# Patient Record
Sex: MALE | Race: WHITE | NOT HISPANIC OR LATINO | URBAN - METROPOLITAN AREA
[De-identification: names, ages, dates, MRNs, and addresses within clinical notes are randomized per-mention and may not be internally consistent; named-entity substitution may affect disease eponyms.]

---

## 2017-03-30 ENCOUNTER — EMERGENCY (EMERGENCY)
Facility: HOSPITAL | Age: 32
LOS: 1 days | Discharge: HOME | End: 2017-03-30
Attending: EMERGENCY MEDICINE | Admitting: EMERGENCY MEDICINE
Payer: COMMERCIAL

## 2017-03-30 VITALS
WEIGHT: 184.97 LBS | HEIGHT: 72 IN | DIASTOLIC BLOOD PRESSURE: 88 MMHG | OXYGEN SATURATION: 98 % | RESPIRATION RATE: 16 BRPM | HEART RATE: 76 BPM | TEMPERATURE: 98 F | SYSTOLIC BLOOD PRESSURE: 146 MMHG

## 2017-03-30 DIAGNOSIS — S61.210A LACERATION WITHOUT FOREIGN BODY OF RIGHT INDEX FINGER WITHOUT DAMAGE TO NAIL, INITIAL ENCOUNTER: ICD-10-CM

## 2017-03-30 PROCEDURE — 99283 EMERGENCY DEPT VISIT LOW MDM: CPT | Mod: 25

## 2017-03-30 PROCEDURE — 73140 X-RAY EXAM OF FINGER(S): CPT

## 2017-03-30 PROCEDURE — 12041 INTMD RPR N-HF/GENIT 2.5CM/<: CPT

## 2017-03-30 PROCEDURE — 73140 X-RAY EXAM OF FINGER(S): CPT | Mod: 26,RT

## 2017-03-30 RX ORDER — IBUPROFEN 200 MG
600 TABLET ORAL ONCE
Qty: 0 | Refills: 0 | Status: COMPLETED | OUTPATIENT
Start: 2017-03-30 | End: 2017-03-30

## 2017-03-30 RX ADMIN — Medication 600 MILLIGRAM(S): at 17:16

## 2017-03-30 NOTE — ED ADULT NURSE NOTE - OBJECTIVE STATEMENT
31 year old male alert and oriented x 4 came to the ED from Urgent Care c/o of right pinky injury at work.  Patient states he smashed his finger between 2 pieces of metal and went to Urgent Care for eval.  Patient states Urgent Care saw debris on his xray and sent him to the ED for evaluation.  Patient came in with a bandage from Urgent Care on the right pinky.  Patient states he has a 2/10 pain level that stings, but in no acute distress.  Patient came to the ED and bleeding was controlled.  Radial pulse present, patient able to move pinky and denies loss of sensation or numbness and tingling or dizziness.  Hand color is consistent with race.  Upon exam by MD patient removed bandage and saw right medial pinky tip was avulsed, but bleeding is controlled.  Md discussed plan of care with patient.  Safety ensured.

## 2017-03-30 NOTE — ED PROVIDER NOTE - MEDICAL DECISION MAKING DETAILS
Otherwise healthy LHD 31 y M presents from urgent care center after crush injury to R 5th digit, distal phalanx.  Hand caught b/t two metal pieces.  Dressed at urgent care center.  Tdap UTD.  No analgesia provided yet.  No paresthesias.  XRs at urgent care center showed no fx but ?FBs?  review of systems -- hand lac, otherwise negative  PE -- GENERAL: AAOx4, GCS 15, NAD, WDWN; HEENT: MMM, no jugular venous distension, supple neck, PERRLA, EOMI, nonicteric sclera; PULM: CTA B, no crackles/rubs/rales; CV: RRR, S1S2, no MRG; ABD: Flat abdomen, NTND, no R/G/R, no CVAT.  MSK: DUBOSE, +2 pulses x4;  NEURO: No obvious focal deficits; PSYCH: AAOx3, clear thought and normal sensorium.  SKIN -- Partially avulsed/lacerated distal phalanx on R hand.  Minimally involving nail plate, distal aspect well perfused.  Minimally oozing blood.  No nv deficits.  No other injuries.  AP -- Crush injury to nondominant 5th distal phalanx.  No amputation.  ?FB on outpt xr?  Pain control, analgesia, copious irrigation, repair lac, rpt xr, d/c.  --PARTH Otherwise healthy LHD 31 y M presents from urgent care center after crush injury to R 5th digit, distal phalanx.  Hand caught b/t two metal pieces.  Dressed at urgent care center.  Tdap UTD.  No analgesia provided yet.  No paresthesias.  XRs at urgent care center showed no fx but ?FBs?  review of systems -- hand lac, otherwise negative  PE -- GENERAL: AAOx4, GCS 15, NAD, WDWN; HEENT: MMM, no jugular venous distension, supple neck, PERRLA, EOMI, nonicteric sclera; PULM: CTA B, no crackles/rubs/rales; CV: RRR, S1S2, no MRG; ABD: Flat abdomen, NTND, no R/G/R, no CVAT.  MSK: DUBOSE, +2 pulses x4;  NEURO: No obvious focal deficits; PSYCH: AAOx3, clear thought and normal sensorium.  SKIN -- 0.5cm laceration at distal-most aspect of d phalanx on R hand.  Minimally involving nail plate, distal aspect well perfused.  Minimally oozing blood.  No nv deficits.  No other injuries.  AP -- Crush injury to nondominant 5th distal phalanx.  No amputation.  ?FB on outpt xr?  Pain control, analgesia, copious irrigation, repair lac, rpt xr, d/c.  --BMM

## 2017-03-30 NOTE — ED PROVIDER NOTE - PROGRESS NOTE DETAILS
Punctate metallic FB x 2 removed from lac.  See procedure note.  --BMM discussed with patient need for antibiotic coverage. sent clindamycin TID to pharmacy and advised on use. Advised on importance of follow-up with hand surgeon within a week for reevaluation. patient understands and agrees. -Pepper Goetz PA-C

## 2017-04-12 ENCOUNTER — EMERGENCY (EMERGENCY)
Facility: HOSPITAL | Age: 32
LOS: 1 days | Discharge: HOME | End: 2017-04-12
Attending: EMERGENCY MEDICINE | Admitting: EMERGENCY MEDICINE
Payer: COMMERCIAL

## 2017-04-12 VITALS
HEART RATE: 94 BPM | TEMPERATURE: 98 F | DIASTOLIC BLOOD PRESSURE: 90 MMHG | OXYGEN SATURATION: 97 % | RESPIRATION RATE: 18 BRPM | SYSTOLIC BLOOD PRESSURE: 132 MMHG
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
## 2017-04-12 DIAGNOSIS — S61.324D: ICD-10-CM

## 2017-04-12 DIAGNOSIS — Z88.0 ALLERGY STATUS TO PENICILLIN: ICD-10-CM

## 2017-04-12 DIAGNOSIS — Z79.899 OTHER LONG TERM (CURRENT) DRUG THERAPY: ICD-10-CM

## 2017-04-12 PROCEDURE — G0463: CPT

## 2017-04-12 NOTE — ED PROVIDER NOTE - ATTENDING CONTRIBUTION TO CARE
Attending MD [place]: I personally have seen and examined this patient.  Resident note reviewed and agree on plan of care and except where noted.  See HPI for details.

## 2017-04-12 NOTE — ED ADULT NURSE NOTE - OBJECTIVE STATEMENT
31y male pt, no PMH, arrived to ED for suture removal on right pinky finger s/p injury at work on Mar 30. Wound is well proximated, no fever/chills, no drainage, no redness noted.

## 2017-04-12 NOTE — ED PROVIDER NOTE - OBJECTIVE STATEMENT
Attending MD [place]: 31M with no reported PMH presents for removal of sutures from R pinky s/p "slamming finger between two metal plates".  Since repair laceration, no purulence or bleeding from site.  Denies fevers, chills, limitation of motion at finger or hand, redness surrounding wound, sensory loss.  Reports Tetanus was UTD at time of injury.  On exam, edges well approximated, C/D/I, cap refill <2 sec.  Will d/c sutures and then discharge patient.

## 2018-12-26 ENCOUNTER — TRANSCRIPTION ENCOUNTER (OUTPATIENT)
Age: 33
End: 2018-12-26

## 2018-12-27 ENCOUNTER — INPATIENT (INPATIENT)
Facility: HOSPITAL | Age: 33
LOS: 1 days | Discharge: HOME | DRG: 872 | End: 2018-12-29
Attending: HOSPITALIST | Admitting: HOSPITALIST
Payer: COMMERCIAL

## 2018-12-27 VITALS
DIASTOLIC BLOOD PRESSURE: 97 MMHG | SYSTOLIC BLOOD PRESSURE: 164 MMHG | HEART RATE: 100 BPM | OXYGEN SATURATION: 98 % | TEMPERATURE: 98 F | WEIGHT: 190.04 LBS | HEIGHT: 72 IN | RESPIRATION RATE: 18 BRPM

## 2018-12-27 LAB
ALBUMIN SERPL ELPH-MCNC: 4.6 G/DL — SIGNIFICANT CHANGE UP (ref 3.3–5)
ALP SERPL-CCNC: 77 U/L — SIGNIFICANT CHANGE UP (ref 40–120)
ALT FLD-CCNC: 23 U/L — SIGNIFICANT CHANGE UP (ref 10–45)
ANION GAP SERPL CALC-SCNC: 14 MMOL/L — SIGNIFICANT CHANGE UP (ref 5–17)
AST SERPL-CCNC: 26 U/L — SIGNIFICANT CHANGE UP (ref 10–40)
BASOPHILS # BLD AUTO: 0 K/UL — SIGNIFICANT CHANGE UP (ref 0–0.2)
BASOPHILS NFR BLD AUTO: 0.2 % — SIGNIFICANT CHANGE UP (ref 0–2)
BILIRUB SERPL-MCNC: 0.7 MG/DL — SIGNIFICANT CHANGE UP (ref 0.2–1.2)
BUN SERPL-MCNC: 7 MG/DL — SIGNIFICANT CHANGE UP (ref 7–23)
CALCIUM SERPL-MCNC: 9.3 MG/DL — SIGNIFICANT CHANGE UP (ref 8.4–10.5)
CHLORIDE SERPL-SCNC: 97 MMOL/L — SIGNIFICANT CHANGE UP (ref 96–108)
CO2 SERPL-SCNC: 26 MMOL/L — SIGNIFICANT CHANGE UP (ref 22–31)
CREAT SERPL-MCNC: 0.96 MG/DL — SIGNIFICANT CHANGE UP (ref 0.5–1.3)
EOSINOPHIL # BLD AUTO: 0.1 K/UL — SIGNIFICANT CHANGE UP (ref 0–0.5)
EOSINOPHIL NFR BLD AUTO: 0.6 % — SIGNIFICANT CHANGE UP (ref 0–6)
GLUCOSE SERPL-MCNC: 106 MG/DL — HIGH (ref 70–99)
HCT VFR BLD CALC: 41.7 % — SIGNIFICANT CHANGE UP (ref 39–50)
HGB BLD-MCNC: 14.7 G/DL — SIGNIFICANT CHANGE UP (ref 13–17)
LYMPHOCYTES # BLD AUTO: 1 K/UL — SIGNIFICANT CHANGE UP (ref 1–3.3)
LYMPHOCYTES # BLD AUTO: 7.2 % — LOW (ref 13–44)
MCHC RBC-ENTMCNC: 32.5 PG — SIGNIFICANT CHANGE UP (ref 27–34)
MCHC RBC-ENTMCNC: 35.3 GM/DL — SIGNIFICANT CHANGE UP (ref 32–36)
MCV RBC AUTO: 92 FL — SIGNIFICANT CHANGE UP (ref 80–100)
MONOCYTES # BLD AUTO: 1 K/UL — HIGH (ref 0–0.9)
MONOCYTES NFR BLD AUTO: 6.8 % — SIGNIFICANT CHANGE UP (ref 2–14)
NEUTROPHILS # BLD AUTO: 12.2 K/UL — HIGH (ref 1.8–7.4)
NEUTROPHILS NFR BLD AUTO: 85.2 % — HIGH (ref 43–77)
PLATELET # BLD AUTO: 188 K/UL — SIGNIFICANT CHANGE UP (ref 150–400)
POTASSIUM SERPL-MCNC: 4.2 MMOL/L — SIGNIFICANT CHANGE UP (ref 3.5–5.3)
POTASSIUM SERPL-SCNC: 4.2 MMOL/L — SIGNIFICANT CHANGE UP (ref 3.5–5.3)
PROT SERPL-MCNC: 7.5 G/DL — SIGNIFICANT CHANGE UP (ref 6–8.3)
RBC # BLD: 4.53 M/UL — SIGNIFICANT CHANGE UP (ref 4.2–5.8)
RBC # FLD: 11.2 % — SIGNIFICANT CHANGE UP (ref 10.3–14.5)
SODIUM SERPL-SCNC: 137 MMOL/L — SIGNIFICANT CHANGE UP (ref 135–145)
WBC # BLD: 14.3 K/UL — HIGH (ref 3.8–10.5)
WBC # FLD AUTO: 14.3 K/UL — HIGH (ref 3.8–10.5)

## 2018-12-27 PROCEDURE — 99220: CPT

## 2018-12-27 RX ORDER — SODIUM CHLORIDE 9 MG/ML
3 INJECTION INTRAMUSCULAR; INTRAVENOUS; SUBCUTANEOUS EVERY 8 HOURS
Qty: 0 | Refills: 0 | Status: DISCONTINUED | OUTPATIENT
Start: 2018-12-27 | End: 2018-12-29

## 2018-12-27 RX ORDER — AMPICILLIN SODIUM AND SULBACTAM SODIUM 250; 125 MG/ML; MG/ML
INJECTION, POWDER, FOR SUSPENSION INTRAMUSCULAR; INTRAVENOUS
Qty: 0 | Refills: 0 | Status: DISCONTINUED | OUTPATIENT
Start: 2018-12-27 | End: 2018-12-27

## 2018-12-27 RX ORDER — AMPICILLIN SODIUM AND SULBACTAM SODIUM 250; 125 MG/ML; MG/ML
3 INJECTION, POWDER, FOR SUSPENSION INTRAMUSCULAR; INTRAVENOUS ONCE
Qty: 0 | Refills: 0 | Status: COMPLETED | OUTPATIENT
Start: 2018-12-27 | End: 2018-12-27

## 2018-12-27 RX ORDER — KETOROLAC TROMETHAMINE 30 MG/ML
15 SYRINGE (ML) INJECTION ONCE
Qty: 0 | Refills: 0 | Status: DISCONTINUED | OUTPATIENT
Start: 2018-12-27 | End: 2018-12-27

## 2018-12-27 RX ORDER — SODIUM CHLORIDE 9 MG/ML
1000 INJECTION, SOLUTION INTRAVENOUS ONCE
Qty: 0 | Refills: 0 | Status: COMPLETED | OUTPATIENT
Start: 2018-12-27 | End: 2018-12-27

## 2018-12-27 RX ADMIN — Medication 15 MILLIGRAM(S): at 21:23

## 2018-12-27 RX ADMIN — AMPICILLIN SODIUM AND SULBACTAM SODIUM 3 GRAM(S): 250; 125 INJECTION, POWDER, FOR SUSPENSION INTRAMUSCULAR; INTRAVENOUS at 21:13

## 2018-12-27 RX ADMIN — Medication 15 MILLIGRAM(S): at 20:43

## 2018-12-27 RX ADMIN — SODIUM CHLORIDE 3 MILLILITER(S): 9 INJECTION INTRAMUSCULAR; INTRAVENOUS; SUBCUTANEOUS at 21:23

## 2018-12-27 RX ADMIN — Medication 100 MILLIGRAM(S): at 20:43

## 2018-12-27 RX ADMIN — SODIUM CHLORIDE 4000 MILLILITER(S): 9 INJECTION, SOLUTION INTRAVENOUS at 21:22

## 2018-12-27 RX ADMIN — AMPICILLIN SODIUM AND SULBACTAM SODIUM 200 GRAM(S): 250; 125 INJECTION, POWDER, FOR SUSPENSION INTRAMUSCULAR; INTRAVENOUS at 20:43

## 2018-12-27 RX ADMIN — SODIUM CHLORIDE 1000 MILLILITER(S): 9 INJECTION, SOLUTION INTRAVENOUS at 23:26

## 2018-12-27 NOTE — ED PROVIDER NOTE - MEDICAL DECISION MAKING DETAILS
see attending note ------------ATTENDING NOTE------------  pt c/o gradually increasing redness, mild dull ache, slight swelling to R anterior shin, no recent trauma, c/w cellulitis, no significant improvement w/ Bactrim, no obvious abscess, has FROM and 5/5 w/o pain in knee, nvi w bcr distally, will cover strep/mrsa, CDU for obs for improvement, potential US if no significant improvement and new concerns for abscess, likely d/c w/ close outpt fu if wnl.  - Burak Blake MD   -----------------------------------------------

## 2018-12-27 NOTE — ED CDU PROVIDER INITIAL DAY NOTE - ATTENDING CONTRIBUTION TO CARE
------------ATTENDING NOTE------------   see my ED Note -- agree w/ PA's documentation, clinical actions, medical decision making.  - Burak Blake MD   ----------------------------------------------

## 2018-12-27 NOTE — ED ADULT NURSE NOTE - NSIMPLEMENTINTERV_GEN_ALL_ED
Implemented All Universal Safety Interventions:  [place] to call system. Call bell, personal items and telephone within reach. Instruct patient to call for assistance. Room bathroom lighting operational. Non-slip footwear when patient is off stretcher. Physically safe environment: no spills, clutter or unnecessary equipment. Stretcher in lowest position, wheels locked, appropriate side rails in place.

## 2018-12-27 NOTE — ED CDU PROVIDER DISPOSITION NOTE - PLAN OF CARE
1. Rest and elevate affected area. stay hydrated.  2. Take Doxycycline and Augmentin as prescribed. Take Motrin 600mg every 8 hours with food for pain/swelling.   3. Follow up with your PMD within 48-72hours.   4. Any worsening redness, swelling, streaking (red lines), fever, chills return to ER.

## 2018-12-27 NOTE — ED CDU PROVIDER DISPOSITION NOTE - CLINICAL COURSE
34y/o M with no PMH c/o LLE swelling/pain x 3 days. pt states he noticed a bump he thought was an ingrown hair on his proximal shin, scratched it, went to to work at [employer], leg got wet while working, then noticed the swelling and redness. swelling and redness worsened. pt went to  and was started on Bactrim DS, took 3 doses, but leg continued to worsen, developed some pain, increased redness and swelling, noted minimal pus d/c. states pain is worse with bending knee and with weight bearing. denies fever, chills, sweats, CP, abd pain, back pain, SOB, N/V/D, other symptoms.   In ED WBC 14.3, other labs unremarkable, pt started on doxy and unasyn. pt sent to CDU for continue antibiotics.   In CDU, ___________. 34y/o M with no PMH c/o LLE swelling/pain x 3 days. pt states he noticed a bump he thought was an ingrown hair on his proximal shin, scratched it, went to to work at [employer], leg got wet while working, then noticed the swelling and redness. swelling and redness worsened. pt went to  and was started on Bactrim DS, took 3 doses, but leg continued to worsen, developed some pain, increased redness and swelling, noted minimal pus d/c. states pain is worse with bending knee and with weight bearing. denies fever, chills, sweats, CP, abd pain, back pain, SOB, N/V/D, other symptoms.   In ED WBC 14.3, other labs unremarkable, pt started on doxy and unasyn. pt sent to CDU for continue antibiotics.   In CDU pt cellulitis worsened despite Doxy+Unasyn. Abx regimen was changed to Doxy+Cipro, pt had one dose of ciprofloxacin and on repeat examination there was no apparent change in erythema. ID consulted to ensure proper abx regimen, recommended IV abx x 24 hours with repeat ID eval in the morning. Pt will need to be admitted for IV abx and consult. Pt safe and stable for admission.

## 2018-12-27 NOTE — ED CDU PROVIDER INITIAL DAY NOTE - OBJECTIVE STATEMENT
34y/o M with no PMH c/o LLE swelling/pain x 3 days. pt states he noticed a bump he thought was an ingrown hair on his proximal shin, scratched it, went to to work at [employer], leg got wet while working, then noticed the swelling and redness. swelling and redness worsened. pt went to  and was started on Bactrim DS, took 3 doses, but leg continued to worsen, developed some pain, increased redness and swelling, noted minimal pus d/c. states pain is worse with bending knee and with weight bearing. denies fever, chills, sweats, CP, abd pain, back pain, SOB, N/V/D, other symptoms.   In ED WBC 14.3, other labs unremarkable, pt started on doxy and unasyn. pt sent to CDU for continue antibiotics.     PMD Dr. Izabella Tripathi

## 2018-12-27 NOTE — ED CDU PROVIDER DISPOSITION NOTE - ATTENDING CONTRIBUTION TO CARE
Patient in CDU for cellulitis of L shin.  Was on bactrim as outpatient without improvement, went to Emergency Department, started on unasyn and doxycycline and placed in CDU.  Overnight had worsening  spread of cellulitis - unasyn changed to cipro as patient was exposed to bilge water concern for pseudomonal exposure.  Today patient reporting noticing no significant change in symptoms, continuing to have pain, no noted improvement with cellulitis to date.  ID evaluated patient in CDU and recommending minimum of 24 hours continuing current antibiotic regimen, will require admission at this point for further IV antibiotics, ID to follow.  Vic Lord M.D.

## 2018-12-27 NOTE — ED ADULT NURSE NOTE - OBJECTIVE STATEMENT
Pt presents to ED awake, alert and ambulatory, c/o left knee redness and swelling. 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 Pt was started on Bactrim yesterday and did not see improvement so came to ED. No fever. Redness noted to left knee.

## 2018-12-27 NOTE — ED PROVIDER NOTE - FAMILY HISTORY
Mother  Still living? Unknown  Family history of brain aneurysm, Age at diagnosis: Age Unknown     Father  Still living? Unknown  Family history of prostate cancer, Age at diagnosis: Age Unknown     Grandparent  Still living? Unknown  Family history of prostate cancer, Age at diagnosis: Age Unknown     Uncle  Still living? Unknown  Family history of prostate cancer, Age at diagnosis: Age Unknown

## 2018-12-27 NOTE — ED PROVIDER NOTE - PHYSICAL EXAMINATION
R anterior shin w/ 3 cm circular tense erythematous edema, no flocculence/drainage, +FROM RLE, 5/5, nvi w/ bcr distally  Well Appearing, Nontoxic, NAD;  Symm Facies, PERRL 3mm, (-)Pallor, Anicteric, MMM;  RRR w/o m/g/r;   CTAB w/o w/r/r;   Abd soft, nt/nd, +bs;  No edema/calf tender; AOX3, Normal speech, normal strength/sensation/gait

## 2018-12-27 NOTE — ED ADULT NURSE REASSESSMENT NOTE - NS ED NURSE REASSESS COMMENT FT1
Received pt from [facility]) , received pt alert and responsive, oriented x4, denies any respiratory distress, SOB, or difficulty breathing. Pt transferred to CDU for L knee redness, swelling and increased pain with ambulation. Pain currently 3/10, declined pain medication, will monitor and reassess. IV in place, patent and free of signs of infiltration, placed on ordered IV fluids, tolerating well. V/S stable, pt afebrile. Pt educated on unit and unit rules, instructed patient to notify RN of any needed assistance, Pt verbalizes understanding, Call bell placed within reach. Safety maintained. Will continue to monitor. Mother at bedside.

## 2018-12-27 NOTE — ED ADULT NURSE REASSESSMENT NOTE - GENERAL PATIENT STATE
comfortable appearance/cooperative/no change observed/resting/sleeping/family/SO at bedside/smiling/interactive

## 2018-12-27 NOTE — ED ADULT NURSE REASSESSMENT NOTE - COMFORT CARE
plan of care explained/po fluids offered/warm blanket provided/repositioned/ambulated to bathroom/darkened lights

## 2018-12-28 DIAGNOSIS — Z72.0 TOBACCO USE: ICD-10-CM

## 2018-12-28 DIAGNOSIS — L03.90 CELLULITIS, UNSPECIFIED: ICD-10-CM

## 2018-12-28 DIAGNOSIS — Z29.9 ENCOUNTER FOR PROPHYLACTIC MEASURES, UNSPECIFIED: ICD-10-CM

## 2018-12-28 DIAGNOSIS — A41.9 SEPSIS, UNSPECIFIED ORGANISM: ICD-10-CM

## 2018-12-28 PROCEDURE — 99254 IP/OBS CNSLTJ NEW/EST MOD 60: CPT

## 2018-12-28 PROCEDURE — 99217: CPT

## 2018-12-28 PROCEDURE — 99223 1ST HOSP IP/OBS HIGH 75: CPT | Mod: GC

## 2018-12-28 RX ORDER — ACETAMINOPHEN 500 MG
650 TABLET ORAL EVERY 6 HOURS
Qty: 0 | Refills: 0 | Status: DISCONTINUED | OUTPATIENT
Start: 2018-12-28 | End: 2018-12-29

## 2018-12-28 RX ORDER — CIPROFLOXACIN LACTATE 400MG/40ML
400 VIAL (ML) INTRAVENOUS ONCE
Qty: 0 | Refills: 0 | Status: COMPLETED | OUTPATIENT
Start: 2018-12-28 | End: 2018-12-28

## 2018-12-28 RX ORDER — IBUPROFEN 200 MG
400 TABLET ORAL ONCE
Qty: 0 | Refills: 0 | Status: COMPLETED | OUTPATIENT
Start: 2018-12-28 | End: 2018-12-28

## 2018-12-28 RX ORDER — ENOXAPARIN SODIUM 100 MG/ML
40 INJECTION SUBCUTANEOUS DAILY
Qty: 0 | Refills: 0 | Status: DISCONTINUED | OUTPATIENT
Start: 2018-12-28 | End: 2018-12-29

## 2018-12-28 RX ORDER — KETOROLAC TROMETHAMINE 30 MG/ML
15 SYRINGE (ML) INJECTION ONCE
Qty: 0 | Refills: 0 | Status: DISCONTINUED | OUTPATIENT
Start: 2018-12-28 | End: 2018-12-28

## 2018-12-28 RX ORDER — AMPICILLIN SODIUM AND SULBACTAM SODIUM 250; 125 MG/ML; MG/ML
3 INJECTION, POWDER, FOR SUSPENSION INTRAMUSCULAR; INTRAVENOUS EVERY 6 HOURS
Qty: 0 | Refills: 0 | Status: DISCONTINUED | OUTPATIENT
Start: 2018-12-28 | End: 2018-12-28

## 2018-12-28 RX ORDER — CIPROFLOXACIN LACTATE 400MG/40ML
400 VIAL (ML) INTRAVENOUS EVERY 12 HOURS
Qty: 0 | Refills: 0 | Status: DISCONTINUED | OUTPATIENT
Start: 2018-12-28 | End: 2018-12-29

## 2018-12-28 RX ORDER — CIPROFLOXACIN LACTATE 400MG/40ML
VIAL (ML) INTRAVENOUS
Qty: 0 | Refills: 0 | Status: DISCONTINUED | OUTPATIENT
Start: 2018-12-28 | End: 2018-12-29

## 2018-12-28 RX ADMIN — Medication 110 MILLIGRAM(S): at 21:10

## 2018-12-28 RX ADMIN — Medication 650 MILLIGRAM(S): at 20:22

## 2018-12-28 RX ADMIN — Medication 15 MILLIGRAM(S): at 02:33

## 2018-12-28 RX ADMIN — SODIUM CHLORIDE 3 MILLILITER(S): 9 INJECTION INTRAMUSCULAR; INTRAVENOUS; SUBCUTANEOUS at 15:32

## 2018-12-28 RX ADMIN — Medication 110 MILLIGRAM(S): at 07:50

## 2018-12-28 RX ADMIN — Medication 400 MILLIGRAM(S): at 13:55

## 2018-12-28 RX ADMIN — SODIUM CHLORIDE 3 MILLILITER(S): 9 INJECTION INTRAMUSCULAR; INTRAVENOUS; SUBCUTANEOUS at 22:05

## 2018-12-28 RX ADMIN — AMPICILLIN SODIUM AND SULBACTAM SODIUM 3 GRAM(S): 250; 125 INJECTION, POWDER, FOR SUSPENSION INTRAMUSCULAR; INTRAVENOUS at 02:32

## 2018-12-28 RX ADMIN — Medication 200 MILLIGRAM(S): at 16:21

## 2018-12-28 RX ADMIN — SODIUM CHLORIDE 3 MILLILITER(S): 9 INJECTION INTRAMUSCULAR; INTRAVENOUS; SUBCUTANEOUS at 05:31

## 2018-12-28 RX ADMIN — Medication 15 MILLIGRAM(S): at 02:09

## 2018-12-28 RX ADMIN — Medication 650 MILLIGRAM(S): at 20:52

## 2018-12-28 RX ADMIN — Medication 400 MILLIGRAM(S): at 05:48

## 2018-12-28 RX ADMIN — Medication 400 MILLIGRAM(S): at 14:25

## 2018-12-28 RX ADMIN — Medication 200 MILLIGRAM(S): at 04:48

## 2018-12-28 RX ADMIN — AMPICILLIN SODIUM AND SULBACTAM SODIUM 200 GRAM(S): 250; 125 INJECTION, POWDER, FOR SUSPENSION INTRAMUSCULAR; INTRAVENOUS at 02:02

## 2018-12-28 NOTE — H&P ADULT - PROBLEM SELECTOR PLAN 1
Pt met sepsis criteria on admission 2/2 leukocytosis and tachycardia. Likely source is cellulitis from LLE wound. ID following, appreciate recs. Pt nontoxic appearing on exam and currently HD stable  - will c/w IV cipro and doxycycline with plan to switch to PO after continued improvement  - f/u blood cultures  - monitor fever curve, WBC  - leg elevation Pt met sepsis criteria on admission 2/2 leukocytosis and tachycardia. Likely source is cellulitis from LLE wound. ID following, appreciate recs. Pt nontoxic appearing on exam and currently HD stable  - will c/w IV cipro and doxycycline with plan to switch to PO after continued improvement  - f/u blood cultures  - monitor fever curve, WBC  - leg elevation  - will send blood cx if pt spikes fever on broad spectrum abs

## 2018-12-28 NOTE — H&P ADULT - FAMILY HISTORY
Family history of brain aneurysm     Grandparent  Still living? Unknown  Family history of prostate cancer, Age at diagnosis: Age Unknown     Uncle  Still living? Unknown  Family history of prostate cancer, Age at diagnosis: Age Unknown

## 2018-12-28 NOTE — ED CDU PROVIDER SUBSEQUENT DAY NOTE - PHYSICAL EXAMINATION
GENERAL: Pt in NAD, A&O x3.   RESPIRATORY: CTA anterior and posterior b/l, no wheezes, rales, or rhonchi.   CARDIAC: RRR, clear distinct S1, S2, no murmurs, gallops, or rubs.   ABDOMEN: Abd Soft NT.  EXTREMITIES: L lower leg with dark red/purple nodule with central blood blister 1.5cm in size, with surrounding bright red erythema that spreads distally down the LLE becoming a faint blanchable erythema. Erythema is distal to the knee with no obvious joint swelling or deformity. No visible FB. No swelling, no streaking, no fluctuance, no induration, no crepitus. Color appropriate for race, warm, 2+ DP and PT pulses b/l, cap refill < 2 seconds. FROM b/l LE. Normal and equal sensation b/l LE, 5/5 strength b/l LE.

## 2018-12-28 NOTE — H&P ADULT - PROBLEM SELECTOR PLAN 2
Pt declines nicotine patch at this time  - smoking cessation counseling Swelling, erythema, and wound in LLE all c/w cellulitis  - treatment of sepsis as above

## 2018-12-28 NOTE — H&P ADULT - NSHPLABSRESULTS_GEN_ALL_CORE
14.7   14.3  )-----------( 188      ( 27 Dec 2018 20:50 )             41.7       12-27    137  |  97  |  7   ----------------------------<  106<H>  4.2   |  26  |  0.96    Ca    9.3      27 Dec 2018 20:50    TPro  7.5  /  Alb  4.6  /  TBili  0.7  /  DBili  x   /  AST  26  /  ALT  23  /  AlkPhos  77  12-27

## 2018-12-28 NOTE — ED CDU PROVIDER SUBSEQUENT DAY NOTE - ATTENDING CONTRIBUTION TO CARE
Patient in CDU for cellulitis of L shin.  Was on bactrim as outpatient without improvement, went to Emergency Department, started on unasyn and doxycycline and placed in CDU.  Overnight had worsening  spread of cellulitis - unasyn changed to cipro as patient was exposed to bilge water concern for pseudomonal exposure.  Today patient reporting noticing no significant change in symptoms, continuing to have pain, no noted improvement with cellulitis to date - will consult ID for further antibiotic recommendations.

## 2018-12-28 NOTE — H&P ADULT - NSHPREVIEWOFSYSTEMS_GEN_ALL_CORE
REVIEW OF SYSTEMS:    CONSTITUTIONAL: No weakness, fevers or chills  EYES/ENT: No visual changes;  No vertigo or throat pain   NECK: No pain or stiffness  RESPIRATORY: No cough, wheezing, hemoptysis; No shortness of breath  CARDIOVASCULAR: No chest pain or palpitations  GASTROINTESTINAL: No abdominal or epigastric pain. No nausea, vomiting, or hematemesis; No diarrhea or constipation. No melena or hematochezia.  GENITOURINARY: No dysuria, frequency or hematuria  NEUROLOGICAL: No numbness or weakness  SKIN: +erythema, swelling, wound of LLE    All other review of systems is negative unless indicated above.

## 2018-12-28 NOTE — ED CDU PROVIDER SUBSEQUENT DAY NOTE - PROGRESS NOTE DETAILS
CDU NOTE CRISTOBAL WICK: Pt resting comfortably, feeling well without complaint. NAD, VSS. LLE: increased erythema outside marked border, worse from prior exam. 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 case discussed with Dr. Jordan, will d/c unasyn, start cipro and continue doxy. will continue monitoring. if no improvement or if pt continues to worsen may need to get ID c/s and admit for continued IV antibiotics. Pt seen at bedside. Pt in NAD, comfortable. VS stable from last reading. Pt states he is having intermittent ache in the LLE. Pt denies f/c, n/v, abd pain, CP, SOB, difficulty ambulating, numbness, paresthesias, weakness, difficulty moving the extremity. Pt states the erythema is unchanged from previous evaluation. L lower leg with dark red/purple nodule with surrounding bright red erythema that spreads distally down the LLE, faint erythema distally does not cross predrawn border. ID evaluated pt at bedside. Recommend no change in current abx regimen (doxy and Cipro), recommending admission for another day of IV abx with ID consult tomorrow morning and transition to PO abx if improving as expected. Discussed plan with pt. All questions answered. d/w Dr. Lord. Pt stable and ready for DC. Discussed plan with pt. All questions answered. d/w Dr. Lord. Pt stable and ready for admission.

## 2018-12-28 NOTE — ED CDU PROVIDER SUBSEQUENT DAY NOTE - HISTORY
No interval changes since initial CDU provider note. Pt feels well aside from some mild aching of LLE. NAD, VSS. LLE: erythema within marked border. will continue IV unasyn, doxy, and monitoring. - CRISTOBAL Oh

## 2018-12-28 NOTE — H&P ADULT - HISTORY OF PRESENT ILLNESS
Pt is a 33M with no PMHx who presents with LLE swelling and redness for one week. Pt first noticed what he thought was an ingrown hair approx 8 days ago. He picked at it and it drained a pus like fluid. 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 He then noticed that the area of erythema became worse throughout the day. He went to urgent care on 12/26 where he was prescribed bactrim. On the bactrim the pt noticed that the swelling and erythema started to travel distal to the site of the original wound so he came to the ED for further management. Currently he c/o leg pain and swelling. He is able to ambulate, but states the first few steps are uncomfortable. He denies F/C/N/V, CP/SOB, abd pain, dysuria, or flank pain.     On presentation to the ED he was noted to have a WBC count of 14.3, T max was 99.1. He received one dose of unasyn. He was admitted to the CDU and started on cipro and doxy, however he c/o continued pain and swelling. The erythema has mildly improved.

## 2018-12-28 NOTE — H&P ADULT - NSHPPHYSICALEXAM_GEN_ALL_CORE
.  VITAL SIGNS:  T(C): 37.1 (12-28-18 @ 12:14), Max: 37.7 (12-27-18 @ 22:49)  T(F): 98.8 (12-28-18 @ 12:14), Max: 99.8 (12-27-18 @ 22:49)  HR: 77 (12-28-18 @ 12:14) (77 - 100)  BP: 126/78 (12-28-18 @ 12:14) (123/78 - 164/97)  BP(mean): --  RR: 16 (12-28-18 @ 12:14) (16 - 18)  SpO2: 97% (12-28-18 @ 12:14) (97% - 99%)  Wt(kg): --    PHYSICAL EXAM:    Constitutional: WDWN resting comfortably in bed; NAD  Head: NC/AT  Eyes: PERRLA, EOMI, clear conjunctiva  ENT: no nasal discharge; no oropharyngeal erythema or exudates; dry oral mucosa  Neck: supple; no JVD or thyromegaly  Respiratory: CTA B/L; no W/R/R, no retractions  Cardiac: +S1/S2; RRR; no M/R/G; PMI non-displaced  Gastrointestinal: soft, NT/ND; no rebound or guarding; +BS, no hepatosplenomegaly  Extremities: approx 4cm central ecchymotic lesion with dried blood and surrounding swelling, warmth, erythema, and tenderness to touch. Erythema extends to anterior calf, it has receded from area of demarcation. Full ROM in both LE.   Vascular: 2+ radial, DP/PT pulses B/L  Lymphatic: no submandibular or cervical LAD  Neurologic: AAOx3; CNII-XII grossly intact; no focal deficits, motor 5/5 in UE and LE

## 2018-12-28 NOTE — H&P ADULT - PROBLEM SELECTOR PLAN 3
Diet: Regular  DVT ppx: lovenox Pt declines nicotine patch at this time  - smoking cessation counseling

## 2018-12-28 NOTE — CONSULT NOTE ADULT - SUBJECTIVE AND OBJECTIVE BOX
Patient is a 33y old  Male who presents with a chief complaint of Lt lower extremity cellulitis.     HPI:    32y/o M with no PMH c/o LLE swelling/pain x 3 days. Pt states he noticed a bump he thought was an ingrown hair on his proximal shin, scratched it, went to work at [employer], leg got wet while working, then noticed the swelling and redness. He did clean a tank with slime and salt water. The swelling and redness worsened over the course of the day. Pt went to Urgent Care on 12/26 and was started on Bactrim DS, took 3 doses, but leg continued to worsen, developed some pain, increased redness and swelling, noted minimal pus d/c so came in to ER. Pt states pain is worse with bending knee and with weight bearing. Denies fever, chills, sweats, CP, abd pain, back pain, SOB, N/V/D, other symptoms.   	In ED WBC 14.3, other labs unremarkable, pt started on doxy and unasyn. He received 1 dose of Unasyn and then was switched to Cipro and doxy. Pt sent to CDU for continue antibiotics. Today he says the leg is still painful mostly at the site of the initial bump and a little around it. The redness has progressed down the leg since arrival. So far pt has received only 2 full days of abx.       PAST MEDICAL & SURGICAL HISTORY:  No pertinent past medical history  No significant past surgical history      REVIEW OF SYSTEMS    General: Denies any chills. Fevers absent    Skin: Per HPI. Pt does tend to get ingrown hair and he picks them and they resolve. Never had cellulitis before.   	  Ophthalmologic: Denies any visual complaints, discharge, redness.  	  ENT: No nasal congestion or throat pain.     Respiratory and Thorax: + smoker's cough, no sputum. Denies shortness of breath.  	  Cardiovascular: No chest pain, palpitations.    Gastrointestinal: No nausea, abdominal pain or diarrhea.    Genitourinary: No dysuria, frequency. No flank pain.    Musculoskeletal: No joint swelling, ROM of lt knee somewhat limited due to pain.     Neurological: No confusion. No extremity weakness.    Psychiatric: No hallucinations	    Endocrine: No recent weight gain or loss. No abnormal heat/cold intolerance    Allergic/Immunologic:	No hives or rash       Social history:  Lives alone, current smoker.       FAMILY HISTORY:  Family history of prostate cancer (Grandparent, Uncle)  Family history of brain aneurysm (Mother)       Allergies  Duricef (Unknown)  Pt tolerates Amoxicillin without any issues.         Antimicrobials:  ciprofloxacin   IVPB 400 milliGRAM(s) IV Intermittent every 12 hours  doxycycline IVPB 100 milliGRAM(s) IV Intermittent every 12 hours        Vital Signs Last 24 Hrs  T(C): 37.1 (28 Dec 2018 12:14), Max: 37.7 (27 Dec 2018 22:49)  T(F): 98.8 (28 Dec 2018 12:14), Max: 99.8 (27 Dec 2018 22:49)  HR: 77 (28 Dec 2018 12:14) (77 - 100)  BP: 126/78 (28 Dec 2018 12:14) (123/78 - 164/97)  RR: 16 (28 Dec 2018 12:14) (16 - 18)  SpO2: 97% (28 Dec 2018 12:14) (97% - 99%)      PHYSICAL EXAM: Patient in no acute distress.    Constitutional: Comfortable. Awake and alert    Eyes: No discharge or conjunctival injection    ENT: No thrush. No pharyngeal exudate or erythema.    Neck: Supple,     Respiratory: Good air entry bilaterally.    Cardiovascular: S1 S2 wnl, No murmurs.    Gastrointestinal: Soft BS(+) no tenderness, non distended.    Genitourinary: No CVA tenderness     Extremities: Lt lower extremity distal knee with a central ecchymotic bump with surrounding swelling, warmth, erythema and tenderness. Erythema extending down the leg anteriorly and medially.     Vascular: peripheral pulses felt    Neurological: AAO X 3. No grossly focal deficits.    Skin: No rash     Musculoskeletal: No joint swelling. ROM of lt knee somewhat limited due to pain but still able to bend more than 60 degrees.     Psychiatric: Affect normal.                              14.7   14.3  )-----------( 188      ( 27 Dec 2018 20:50 )             41.7       12-27    137  |  97  |  7   ----------------------------<  106<H>  4.2   |  26  |  0.96    Ca    9.3      27 Dec 2018 20:50    TPro  7.5  /  Alb  4.6  /  TBili  0.7  /  DBili  x   /  AST  26  /  ALT  23  /  AlkPhos  77  12-27        Radiology:   None performed.

## 2018-12-28 NOTE — H&P ADULT - ASSESSMENT
33M with no PMHx who presents with LLE swelling and redness found to have sepsis 2/2 cellulitis of LLE that has failed PO antibiotics.

## 2018-12-28 NOTE — CONSULT NOTE ADULT - ATTENDING COMMENTS
Tila Ferreira  Pager: [phone]. If no response or past 5 pm call [phone].     Please call ID service for questions over weekend at [phone].

## 2018-12-28 NOTE — CONSULT NOTE ADULT - ASSESSMENT
32y/o M with no PMH c/o LLE swelling/pain x 3 days.   Sepsis (tachycardic, leucocytosis) due to LLE cellulitis.   Pt with exposure to salt water and slime which can predispose to Vibrio and Aeromonas along with the usual pathogens.     Plan:   Continue with Doxy 100 mg q12h  Continue with Cipro 400 iv q12h for now   Monitor CBC   Leg elevation   No obvious fluctuance at this time.   If continues to improve can switch to po abx.

## 2018-12-29 ENCOUNTER — TRANSCRIPTION ENCOUNTER (OUTPATIENT)
Age: 33
End: 2018-12-29

## 2018-12-29 VITALS
SYSTOLIC BLOOD PRESSURE: 132 MMHG | TEMPERATURE: 99 F | DIASTOLIC BLOOD PRESSURE: 81 MMHG | OXYGEN SATURATION: 98 % | HEART RATE: 78 BPM | RESPIRATION RATE: 17 BRPM

## 2018-12-29 LAB
ANION GAP SERPL CALC-SCNC: 15 MMOL/L — SIGNIFICANT CHANGE UP (ref 5–17)
BASOPHILS # BLD AUTO: 0 K/UL — SIGNIFICANT CHANGE UP (ref 0–0.2)
BASOPHILS NFR BLD AUTO: 0.5 % — SIGNIFICANT CHANGE UP (ref 0–2)
BUN SERPL-MCNC: 11 MG/DL — SIGNIFICANT CHANGE UP (ref 7–23)
CALCIUM SERPL-MCNC: 9.1 MG/DL — SIGNIFICANT CHANGE UP (ref 8.4–10.5)
CHLORIDE SERPL-SCNC: 101 MMOL/L — SIGNIFICANT CHANGE UP (ref 96–108)
CO2 SERPL-SCNC: 24 MMOL/L — SIGNIFICANT CHANGE UP (ref 22–31)
CREAT SERPL-MCNC: 0.93 MG/DL — SIGNIFICANT CHANGE UP (ref 0.5–1.3)
EOSINOPHIL # BLD AUTO: 0.2 K/UL — SIGNIFICANT CHANGE UP (ref 0–0.5)
EOSINOPHIL NFR BLD AUTO: 1.5 % — SIGNIFICANT CHANGE UP (ref 0–6)
GLUCOSE SERPL-MCNC: 104 MG/DL — HIGH (ref 70–99)
HCT VFR BLD CALC: 41.9 % — SIGNIFICANT CHANGE UP (ref 39–50)
HGB BLD-MCNC: 14.6 G/DL — SIGNIFICANT CHANGE UP (ref 13–17)
LYMPHOCYTES # BLD AUTO: 1.7 K/UL — SIGNIFICANT CHANGE UP (ref 1–3.3)
LYMPHOCYTES # BLD AUTO: 15.9 % — SIGNIFICANT CHANGE UP (ref 13–44)
MAGNESIUM SERPL-MCNC: 2.1 MG/DL — SIGNIFICANT CHANGE UP (ref 1.6–2.6)
MCHC RBC-ENTMCNC: 32 PG — SIGNIFICANT CHANGE UP (ref 27–34)
MCHC RBC-ENTMCNC: 34.8 GM/DL — SIGNIFICANT CHANGE UP (ref 32–36)
MCV RBC AUTO: 91.9 FL — SIGNIFICANT CHANGE UP (ref 80–100)
MONOCYTES # BLD AUTO: 0.8 K/UL — SIGNIFICANT CHANGE UP (ref 0–0.9)
MONOCYTES NFR BLD AUTO: 7.7 % — SIGNIFICANT CHANGE UP (ref 2–14)
NEUTROPHILS # BLD AUTO: 8.1 K/UL — HIGH (ref 1.8–7.4)
NEUTROPHILS NFR BLD AUTO: 74.5 % — SIGNIFICANT CHANGE UP (ref 43–77)
PHOSPHATE SERPL-MCNC: 3.6 MG/DL — SIGNIFICANT CHANGE UP (ref 2.5–4.5)
PLATELET # BLD AUTO: 204 K/UL — SIGNIFICANT CHANGE UP (ref 150–400)
POTASSIUM SERPL-MCNC: 4.1 MMOL/L — SIGNIFICANT CHANGE UP (ref 3.5–5.3)
POTASSIUM SERPL-SCNC: 4.1 MMOL/L — SIGNIFICANT CHANGE UP (ref 3.5–5.3)
RBC # BLD: 4.55 M/UL — SIGNIFICANT CHANGE UP (ref 4.2–5.8)
RBC # FLD: 11 % — SIGNIFICANT CHANGE UP (ref 10.3–14.5)
SODIUM SERPL-SCNC: 140 MMOL/L — SIGNIFICANT CHANGE UP (ref 135–145)
WBC # BLD: 10.8 K/UL — HIGH (ref 3.8–10.5)
WBC # FLD AUTO: 10.8 K/UL — HIGH (ref 3.8–10.5)

## 2018-12-29 PROCEDURE — 80048 BASIC METABOLIC PNL TOTAL CA: CPT

## 2018-12-29 PROCEDURE — 84100 ASSAY OF PHOSPHORUS: CPT

## 2018-12-29 PROCEDURE — 85027 COMPLETE CBC AUTOMATED: CPT

## 2018-12-29 PROCEDURE — 96376 TX/PRO/DX INJ SAME DRUG ADON: CPT

## 2018-12-29 PROCEDURE — G0378: CPT

## 2018-12-29 PROCEDURE — 80053 COMPREHEN METABOLIC PANEL: CPT

## 2018-12-29 PROCEDURE — 99239 HOSP IP/OBS DSCHRG MGMT >30: CPT

## 2018-12-29 PROCEDURE — 99285 EMERGENCY DEPT VISIT HI MDM: CPT | Mod: 25

## 2018-12-29 PROCEDURE — 96375 TX/PRO/DX INJ NEW DRUG ADDON: CPT

## 2018-12-29 PROCEDURE — 96361 HYDRATE IV INFUSION ADD-ON: CPT

## 2018-12-29 PROCEDURE — 83735 ASSAY OF MAGNESIUM: CPT

## 2018-12-29 PROCEDURE — 96365 THER/PROPH/DIAG IV INF INIT: CPT

## 2018-12-29 PROCEDURE — 96367 TX/PROPH/DG ADDL SEQ IV INF: CPT

## 2018-12-29 PROCEDURE — 96366 THER/PROPH/DIAG IV INF ADDON: CPT

## 2018-12-29 RX ORDER — AZTREONAM 2 G
1 VIAL (EA) INJECTION
Qty: 0 | Refills: 0 | COMMUNITY

## 2018-12-29 RX ORDER — CIPROFLOXACIN LACTATE 400MG/40ML
1 VIAL (ML) INTRAVENOUS
Qty: 24 | Refills: 0 | OUTPATIENT
Start: 2018-12-29 | End: 2019-01-09

## 2018-12-29 RX ADMIN — SODIUM CHLORIDE 3 MILLILITER(S): 9 INJECTION INTRAMUSCULAR; INTRAVENOUS; SUBCUTANEOUS at 05:51

## 2018-12-29 RX ADMIN — SODIUM CHLORIDE 3 MILLILITER(S): 9 INJECTION INTRAMUSCULAR; INTRAVENOUS; SUBCUTANEOUS at 12:12

## 2018-12-29 RX ADMIN — Medication 650 MILLIGRAM(S): at 05:50

## 2018-12-29 RX ADMIN — Medication 200 MILLIGRAM(S): at 05:48

## 2018-12-29 RX ADMIN — ENOXAPARIN SODIUM 40 MILLIGRAM(S): 100 INJECTION SUBCUTANEOUS at 12:14

## 2018-12-29 RX ADMIN — Medication 110 MILLIGRAM(S): at 09:14

## 2018-12-29 NOTE — DISCHARGE NOTE ADULT - PLAN OF CARE
Resolving, Continue to take your antibiotics: Ciprofloxacin and Doxycycline You presented with cellulitis, an superficial skin infection due to your recent wound. It is improving on the appropriate antibiotics.  -Continue to take your antibiotics for 2x/day for 12 more days to complete a 2 week course  -follow-up with your primary care doctor within 1 week to monitor this infection  -All antibiotics can cause diarrhea, go to the doctor if you have diarrhea for several days that does not go away on its own. We suggest taking a probiotic while you are on these antibiotics.

## 2018-12-29 NOTE — PROGRESS NOTE ADULT - SUBJECTIVE AND OBJECTIVE BOX
Timbo Leon, PGY1  Pager: [phone]      Patient is a 33y old  Male who presents with a chief complaint of Cellulitis (28 Dec 2018 15:41)      SUBJECTIVE / OVERNIGHT EVENTS: No acute events overnight, afebrile. Pt this morning states that he feels better, the swelling from his leg has improved and he has less pain. States that he would like to go home today if possible.     REVIEW OF SYSTEMS:    CONSTITUTIONAL: No weakness, fevers or chills  EYES/ENT: No visual changes;  No vertigo or throat pain   NECK: No pain or stiffness  RESPIRATORY: No cough, wheezing, hemoptysis; No shortness of breath  CARDIOVASCULAR: No chest pain or palpitations  GASTROINTESTINAL: No abdominal or epigastric pain. No nausea, vomiting, or hematemesis; No diarrhea or constipation. No melena or hematochezia.  GENITOURINARY: No dysuria, frequency or hematuria  NEUROLOGICAL: No numbness or weakness  SKIN: +left lower extremity erythema and swelling    MEDICATIONS  (STANDING):  ciprofloxacin   IVPB      ciprofloxacin   IVPB 400 milliGRAM(s) IV Intermittent every 12 hours  doxycycline IVPB 100 milliGRAM(s) IV Intermittent every 12 hours  enoxaparin Injectable 40 milliGRAM(s) SubCutaneous daily  sodium chloride 0.9% lock flush 3 milliLiter(s) IV Push every 8 hours    MEDICATIONS  (PRN):  acetaminophen   Tablet .. 650 milliGRAM(s) Oral every 6 hours PRN Temp greater or equal to 38C (100.4F), Moderate Pain (4 - 6)      Vital Signs Last 24 Hrs  T(C): 36.8 (29 Dec 2018 05:47), Max: 37.1 (28 Dec 2018 12:14)  T(F): 98.3 (29 Dec 2018 05:47), Max: 98.8 (28 Dec 2018 12:14)  HR: 69 (29 Dec 2018 05:47) (69 - 77)  BP: 118/76 (29 Dec 2018 05:47) (100/69 - 128/79)  BP(mean): --  RR: 20 (29 Dec 2018 05:47) (16 - 20)  SpO2: 96% (29 Dec 2018 05:47) (95% - 98%)  CAPILLARY BLOOD GLUCOSE        I&O's Summary      PHYSICAL EXAM:  GENERAL: NAD, well-developed, young, appears comfortable  EYES: EOMI, PERRLA, conjunctiva and sclera clear  NECK:  No JVD  CHEST/LUNG: CTABL ; No wheeze  HEART: RRR; No murmurs  ABDOMEN: Soft, Nontender, Nondistended; Bowel sounds present  : No [place]  EXTREMITIES:  2+ Peripheral Pulses, Left lower extremity with area of erythema retracted from prior demarcation, two areas of dried blood with necrotic centers, dorsal aspect near knee joint. Posterior calf with mild erythema diffusely. Warm and tenderness on light palpation.   PSYCH: AAOx3  NEUROLOGY: non-focal  SKIN: No rashes or lesions. No sacral ulcer    LABS:                        14.6   10.8  )-----------( 204      ( 29 Dec 2018 06:26 )             41.9     12-29    140  |  101  |  11  ----------------------------<  104<H>  4.1   |  24  |  0.93    Ca    9.1      29 Dec 2018 06:26  Phos  3.6     12-29  Mg     2.1     12-29    TPro  7.5  /  Alb  4.6  /  TBili  0.7  /  DBili  x   /  AST  26  /  ALT  23  /  AlkPhos  77  12-27              Microbiology;        RADIOLOGY & ADDITIONAL TESTS:    Imaging Personally Reviewed:          Consultant(s) Notes Reviewed: Yes       Care Discussed with Consultants/Other Providers: Yes

## 2018-12-29 NOTE — DISCHARGE NOTE ADULT - PATIENT PORTAL LINK FT
You can access the [facility] Portal, offered by [facility], by registering with the following website: [email or web address]

## 2018-12-29 NOTE — DISCHARGE NOTE ADULT - CARE PLAN
Principal Discharge DX:	Cellulitis  Goal:	Resolving, Continue to take your antibiotics: Ciprofloxacin and Doxycycline  Assessment and plan of treatment:	You presented with cellulitis, an superficial skin infection due to your recent wound. It is improving on the appropriate antibiotics.  -Continue to take your antibiotics for 2x/day for 12 more days to complete a 2 week course  -follow-up with your primary care doctor within 1 week to monitor this infection  -All antibiotics can cause diarrhea, go to the doctor if you have diarrhea for several days that does not go away on its own. We suggest taking a probiotic while you are on these antibiotics.

## 2018-12-29 NOTE — PROGRESS NOTE ADULT - ASSESSMENT
33M with no PMHx who presents with LLE swelling and redness found to have cellulitis of left lower extremity; previously on Bactrim now being treated with IV Ciprofloxacin and Doxycycline.

## 2018-12-29 NOTE — DISCHARGE NOTE ADULT - OTHER SIGNIFICANT FINDINGS
Complete Blood Count + Automated Diff (12.29.18 @ 06:26)    WBC Count: 10.8 K/uL    RBC Count: 4.55 M/uL    Hemoglobin: 14.6 g/dL    Hematocrit: 41.9 %    Mean Cell Volume: 91.9 fl    Mean Cell Hemoglobin: 32.0 pg    Mean Cell Hemoglobin Conc: 34.8 gm/dL    Red Cell Distrib Width: 11.0 %    Platelet Count - Automated: 204 K/uL    Auto Neutrophil #: 8.1 K/uL    Auto Lymphocyte #: 1.7 K/uL    Auto Monocyte #: 0.8 K/uL    Auto Eosinophil #: 0.2 K/uL    Auto Basophil #: 0.0 K/uL    Auto Neutrophil %: 74.5: Differential percentages must be correlated with absolute numbers for  clinical significance. %    Auto Lymphocyte %: 15.9 %    Auto Monocyte %: 7.7 %    Auto Eosinophil %: 1.5 %    Auto Basophil %: 0.5 %    Basic Metabolic Panel (12.29.18 @ 06:26)    Sodium, Serum: 140 mmol/L    Potassium, Serum: 4.1 mmol/L    Chloride, Serum: 101 mmol/L    Carbon Dioxide, Serum: 24 mmol/L    Anion Gap, Serum: 15 mmol/L    Blood Urea Nitrogen, Serum: 11 mg/dL    Creatinine, Serum: 0.93 mg/dL    Glucose, Serum: 104 mg/dL    Calcium, Total Serum: 9.1 mg/dL    eGFR if Non : 109: Interpretative comment  The units for eGFR are mL/min/1.73M2 (normalized body surface area). The  eGFR is calculated from a serum creatinine using the CKD-EPI equation.  Other variables required for calculation are race, age and sex. Among  patients with chronic kidney disease (CKD), the eGFR is useful in  determining the stage of disease according to KDOQI CKD classification.  All eGFR results are reported numerically with the following  interpretation.          GFR                    With                 Without     (ml/min/1.73 m2)    Kidney Damage       Kidney Damage        >= 90                    Stage 1                     Normal        60-89                    Stage 2                     Decreased GFR        30-59     Stage 3                     Stage 3        15-29                    Stage 4                     Stage 4        < 15                      Stage 5                     Stage 5  Each stage of CKD assumes that the associated GFR level has been in  effect for at least 3 months. Determination of stages one and two (with  eGFR > 59 ml/min/m2) requires estimation of kidney damage for at least 3  months as defined by structural or functional abnormalities.  Limitations: All estimates of GFR will be less accurate for patients at  extremes of muscle mass (including but not limited to frail elderly,  critically ill, or cancer patients), those with unusual diets, and those  with conditions associated with reduced secretion or extrarenal  elimination of creatinine. The eGFR equation is not recommended for use  in patients with unstable creatinine levels. mL/min/1.73M2    eGFR if African American: 126 mL/min/1.73M2

## 2018-12-29 NOTE — DISCHARGE NOTE ADULT - MEDICATION SUMMARY - MEDICATIONS TO TAKE
I will START or STAY ON the medications listed below when I get home from the hospital:    doxycycline hyclate 100 mg oral capsule  -- 1 cap(s) by mouth 2 times a day   -- Avoid prolonged or excessive exposure to direct and/or artificial sunlight while taking this medication.  Do not take this drug if you are pregnant.  Finish all this medication unless otherwise directed by prescriber.  Medication should be taken with plenty of water.    -- Indication: For Cellulitis    ciprofloxacin 500 mg oral tablet  -- 1 tab(s) by mouth 2 times a day   -- Avoid prolonged or excessive exposure to direct and/or artificial sunlight while taking this medication.  Check with your doctor before becoming pregnant.  Do not take dairy products, antacids, or iron preparations within one hour of this medication.  Finish all this medication unless otherwise directed by prescriber.  Medication should be taken with plenty of water.    -- Indication: For Cellulitis

## 2018-12-29 NOTE — PROGRESS NOTE ADULT - PROBLEM SELECTOR PLAN 1
Pt with ingrown hair that developed into purulent wound that was then submerged in dirty water due to pt's job. Previously on PO Bactrim s/p 3 doses with worsening of erythema and pain. Tmax 99.1 with WBC of 14 on presentation.  - On Cipro 400mg IV Q12 and Doxy 100mg IV Q12, Day 2, per ID  - improvement in area of erythema, swelling, and pain  - WBC downtrending  - Afebrile, VSS. Continue antibiotics for now, plan to transition to PO. Pt with ingrown hair that developed into purulent wound that was then submerged in dirty water due to pt's job. Due to wound exposure concern for atypical organisms including Vibrio and Aeromonas.  Previously on PO Bactrim s/p 3 doses with worsening of erythema and pain. Tmax 99.1 with WBC of 14 on presentation.  - On Cipro 400mg IV Q12 and Doxy 100mg IV Q12, Day 2, per ID  - improvement in area of erythema, swelling, and pain  - WBC downtrending  - Afebrile, VSS. Continue antibiotics for now, plan to transition to PO.

## 2018-12-29 NOTE — DISCHARGE NOTE ADULT - CARE PROVIDER_API CALL
Izabella Tripathi), Family Medicine  [address] [place] [address]  Phone: [phone]  Fax: [phone]

## 2018-12-29 NOTE — DISCHARGE NOTE ADULT - HOSPITAL COURSE
Pt is a 33M with no PMHx who presents with LLE swelling and redness for one week. Pt first noticed what he thought was an ingrown hair approx 8 days ago. He picked at it and it drained a pus like fluid. 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 He then noticed that the area of erythema became worse throughout the day. He went to urgent care on 12/26 where he was prescribed bactrim. On the bactrim the pt noticed that the swelling and erythema started to travel distal to the site of the original wound so he came to the ED for further management. Currently he c/o leg pain and swelling. He is able to ambulate, but states the first few steps are uncomfortable. He denies F/C/N/V, CP/SOB, abd pain, dysuria, or flank pain.     On presentation to the ED he was noted to have a WBC count of 14.3, T max was 99.1. He received one dose of unasyn. He was admitted to the CDU and started on cipro and doxy per infectious disease, however he c/o continued pain and swelling. The erythema has mildly improved.    On the next day pt clinical condition improved: erythema was now receding from previously drawn demarcations; swelling was hardly noticeable. Pt was afebrile and labs were otherwise unremarkable with downtrending leukocytosis. Pt will be discharged on oral cipro and doxy to complete a total 2 week course. Pt will need follow up with his PCP in 1 week to evaluate the area of cellulitis for improvement. Low suspicion of any abscess at this point. Pt is now medically stable for discharge. Pt is a 33M with no PMHx who presents with LLE swelling and redness for one week. Pt first noticed what he thought was an ingrown hair approx 8 days ago. He picked at it and it drained a pus like fluid. 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 He then noticed that the area of erythema became worse throughout the day. He went to urgent care on 12/26 where he was prescribed bactrim. On the bactrim the pt noticed that the swelling and erythema started to travel distal to the site of the original wound so he came to the ED for further management. Currently he c/o leg pain and swelling. He is able to ambulate, but states the first few steps are uncomfortable. He denies F/C/N/V, CP/SOB, abd pain, dysuria, or flank pain.     he was admitted for sepsis 2/2 to cellulitis. sepsis resolved    On the next day pt clinical condition improved: erythema was now receding from previously drawn demarcations; swelling was hardly noticeable. Pt was afebrile and labs were otherwise unremarkable with downtrending leukocytosis. Pt will be discharged on oral cipro and doxy to complete a total 2 week course. Pt will need follow up with his PCP in 1 week to evaluate the area of cellulitis for improvement. Low suspicion of any abscess at this point. Pt is now medically stable for discharge.

## 2019-01-10 ENCOUNTER — APPOINTMENT (OUTPATIENT)
Dept: FAMILY MEDICINE | Facility: CLINIC | Age: 34
End: 2019-01-10
Payer: COMMERCIAL

## 2019-01-10 VITALS
WEIGHT: 192 LBS | OXYGEN SATURATION: 96 % | HEART RATE: 80 BPM | HEIGHT: 71 IN | SYSTOLIC BLOOD PRESSURE: 141 MMHG | TEMPERATURE: 98 F | DIASTOLIC BLOOD PRESSURE: 88 MMHG | BODY MASS INDEX: 26.88 KG/M2 | RESPIRATION RATE: 12 BRPM

## 2019-01-10 DIAGNOSIS — F19.10 OTHER PSYCHOACTIVE SUBSTANCE ABUSE, UNCOMPLICATED: ICD-10-CM

## 2019-01-10 DIAGNOSIS — Z87.898 PERSONAL HISTORY OF OTHER SPECIFIED CONDITIONS: ICD-10-CM

## 2019-01-10 DIAGNOSIS — L03.116 CELLULITIS OF LEFT LOWER LIMB: ICD-10-CM

## 2019-01-10 DIAGNOSIS — F12.10 CANNABIS ABUSE, UNCOMPLICATED: ICD-10-CM

## 2019-01-10 PROCEDURE — 99213 OFFICE O/P EST LOW 20 MIN: CPT

## 2019-01-10 RX ORDER — CIPROFLOXACIN HYDROCHLORIDE 500 MG/1
500 TABLET, FILM COATED ORAL
Qty: 14 | Refills: 0 | Status: ACTIVE | COMMUNITY
Start: 2019-01-10 | End: 1900-01-01

## 2019-01-10 RX ORDER — DOXYCYCLINE HYCLATE 100 MG/1
100 CAPSULE ORAL TWICE DAILY
Qty: 14 | Refills: 0 | Status: COMPLETED | COMMUNITY
Start: 2019-01-10 | End: 2019-01-17

## 2019-01-10 NOTE — REVIEW OF SYSTEMS
[Skin Rash] : skin rash [Negative] : Heme/Lymph [de-identified] : erythema of the left knee with some fluctuance over the area just to the lateral aspect of the patella.

## 2019-01-10 NOTE — HISTORY OF PRESENT ILLNESS
[FreeTextEntry1] : Here for follow up after visit to the hospital for cellulitis of the left lower extremity.

## 2019-01-10 NOTE — ASSESSMENT
[FreeTextEntry1] : continue abx for an additional week then discontinue- to call for persistent redness or for development of any fever or other symptoms or swelling increase. Keep clean and dry if any drainage occurs and call for f/u or f/u in 2 weeks if no worsening of symptoms.

## 2019-01-10 NOTE — HEALTH RISK ASSESSMENT
[Intercurrent hospitalizations] : was admitted to the hospital  [] : Yes [No falls in past year] : Patient reported no falls in the past year [de-identified] : admitted to [facility] for cellulitis of the left knee which was drained and treated with IV abx. he was admitted from 12/27- 12/29 and discharged ondoxycycline and cipro which he comopleted this am. He continues to have mild swelling and significnat redness of the area just below the left knee. He denies any fever or chills (did not have that in the hospital) and has no active drainage at this time. Infection resulted from injury to the leg when he scraped the knee and then developed this issue.  [de-identified] : 1/2-1 ppd for the last 10 years [de-identified] : rare use of marijuana but is no longer using any other IV drugs, cocaine or other illicit drugs per his

## 2020-05-26 NOTE — H&P ADULT - PROBLEM SELECTOR PROBLEM 3
Caller would like to discuss an/a reviewing the patients medication list. Writer advised caller of callback within 24-72 hours.    Patient Name: Edmundo Pitts  Caller Name: florence  Name of Facility: [facility]  Callback Number: [phone]  Best Availability: anytime  Can A Detailed Message Be left? yes  Additional Info: patient was admitted to home care 5.25.20  Did you confirm the message with the caller?: yes    Thank you,  Lizette Strickland     Need for prophylactic measure Tobacco use

## 2020-12-13 ENCOUNTER — TRANSCRIPTION ENCOUNTER (OUTPATIENT)
Age: 35
End: 2020-12-13

## 2021-10-13 ENCOUNTER — NON-APPOINTMENT (OUTPATIENT)
Age: 36
End: 2021-10-13

## 2022-01-01 NOTE — PATIENT PROFILE ADULT - FUNCTIONAL SCREEN CURRENT LEVEL: COMMUNICATION, MLM
[de-identified] : 6 Month WCC. Mom mentioned pt eczema is slowly clearing on cheeks. [FreeTextEntry1] : FEEDING:\par Tolerating feeds well.\par BF- formula every 2-3 hours.\par SLEEP: \par No issues.\par ELIMINATION:\par Frequent urination.\par Stools daily, soft.\par SAFETY:\par Rear facing car seat\par No exposure to cigarette smoking.\par CONCERNS:\par dry skin 0 = understands/communicates without difficulty

## 2022-07-06 NOTE — H&P ADULT - NSHPSOCIALHISTORY_GEN_ALL_CORE
Pt lives in private residence. Has smoked 1/2 pack - 1 ppd for 10 years, currently in precontemplative stage of cessation. Social alcohol use, no drug use. no

## 2023-06-30 NOTE — PROGRESS NOTE ADULT - PROBLEM/PLAN-1
DISPLAY PLAN FREE TEXT
Pt walks in c/o chest pain radiating to L arm since this morning. PMH of pericarditis. Pt completed 20 stay at rehab for alcohol this morning.

## 2023-12-07 NOTE — ED PROCEDURE NOTE - PROCEDURE ADDITIONAL DETAILS
Patients simvastatin was sent to [facility] per their request.   
R 2nd distal phalanx laceration thoroughly irrigated after digital nerve block performed (observing sterile technique).  3 punctate FBs removed, small amount of skin debrided.  4 4-0 nylon sutures placed.  Hemostasis achieved.  Wound dressed c xeroform.  Pt tolerated well.  No complications.

## 2025-03-03 ENCOUNTER — NON-APPOINTMENT (OUTPATIENT)
Age: 40
End: 2025-03-03

## 2025-07-08 NOTE — ED ADULT NURSE NOTE - CAS EDP DISCH TYPE
AVS virtually reviewed with patient in its entirety using  #[id number] with emphasis on diet, medications, follow-up appointments and reasons to return to the ED. Patient also encouraged to utilize their patient portal. Ease and convenience of use reiterated. Education complete and patient voiced understanding. All questions answered. Discharge teaching complete.    Home